# Patient Record
Sex: FEMALE
[De-identification: names, ages, dates, MRNs, and addresses within clinical notes are randomized per-mention and may not be internally consistent; named-entity substitution may affect disease eponyms.]

---

## 2017-11-09 ENCOUNTER — FORM ENCOUNTER (OUTPATIENT)
Age: 47
End: 2017-11-09

## 2018-11-15 ENCOUNTER — FORM ENCOUNTER (OUTPATIENT)
Age: 48
End: 2018-11-15

## 2019-11-21 ENCOUNTER — FORM ENCOUNTER (OUTPATIENT)
Age: 49
End: 2019-11-21

## 2020-11-24 ENCOUNTER — FORM ENCOUNTER (OUTPATIENT)
Age: 50
End: 2020-11-24

## 2020-11-29 ENCOUNTER — FORM ENCOUNTER (OUTPATIENT)
Age: 50
End: 2020-11-29

## 2022-01-31 ENCOUNTER — NON-APPOINTMENT (OUTPATIENT)
Age: 52
End: 2022-01-31

## 2022-01-31 ENCOUNTER — APPOINTMENT (OUTPATIENT)
Dept: BREAST CENTER | Facility: CLINIC | Age: 52
End: 2022-01-31
Payer: COMMERCIAL

## 2022-01-31 VITALS
WEIGHT: 105 LBS | BODY MASS INDEX: 19.83 KG/M2 | HEIGHT: 61 IN | HEART RATE: 80 BPM | SYSTOLIC BLOOD PRESSURE: 112 MMHG | DIASTOLIC BLOOD PRESSURE: 75 MMHG

## 2022-01-31 DIAGNOSIS — Z78.9 OTHER SPECIFIED HEALTH STATUS: ICD-10-CM

## 2022-01-31 PROCEDURE — 99213 OFFICE O/P EST LOW 20 MIN: CPT

## 2023-02-01 ENCOUNTER — APPOINTMENT (OUTPATIENT)
Dept: BREAST CENTER | Facility: CLINIC | Age: 53
End: 2023-02-01
Payer: COMMERCIAL

## 2023-02-01 ENCOUNTER — NON-APPOINTMENT (OUTPATIENT)
Age: 53
End: 2023-02-01

## 2023-02-01 VITALS
SYSTOLIC BLOOD PRESSURE: 120 MMHG | DIASTOLIC BLOOD PRESSURE: 77 MMHG | HEIGHT: 61 IN | BODY MASS INDEX: 21.71 KG/M2 | WEIGHT: 115 LBS | HEART RATE: 74 BPM

## 2023-02-01 DIAGNOSIS — Z78.9 OTHER SPECIFIED HEALTH STATUS: ICD-10-CM

## 2023-02-01 DIAGNOSIS — Z00.00 ENCOUNTER FOR GENERAL ADULT MEDICAL EXAMINATION W/OUT ABNORMAL FINDINGS: ICD-10-CM

## 2023-02-01 PROCEDURE — 99213 OFFICE O/P EST LOW 20 MIN: CPT

## 2023-02-01 NOTE — PHYSICAL EXAM
[de-identified] : Bilateral breast/axilla/supraclavicular area: No masses, discharge, or adenopathy

## 2023-02-01 NOTE — PAST MEDICAL HISTORY
[Perimenopausal] : The patient is perimenopausal [FreeTextEntry6] : n/a [FreeTextEntry7] : n/a [FreeTextEntry8] : yes

## 2023-02-01 NOTE — HISTORY OF PRESENT ILLNESS
[FreeTextEntry1] : Patient is a 52y.o female here for breast cancer screening. Hx of LEFT needle loc for benign tissue in 2012 (age 41). No known fam hx of breast/ovarian CA. She denies palpable breast mass or nipple discharge.\par \par 10/10/17: B/l MG & US- 3 benign, stable masses seen in each breast.-BIRAD 2\par 11/16/18: B/l MG & US- Multiple masses bilaterally, 3 on right and 2 on the left all benign inn appearance\par 11/22/19: B/l MG & US- -benign stable masses bilaterally\par 11/25/20: B/l MG & US- stable masses and benign cysts\par 1/31/22: B/l MG & US- R stable nodular asymmetry, US: BL stable nodules, L axillary LN likely from covid vaccine- Rec L US in 2 months- B- RADS 3 \par 4/4/22: L US- 1.0cm morphologically benign-appearing LN. BI-RADS 2\par 2/1/23: B/l MG & US- extremly dense. US- B/l cysts. L 0.7cm nodule 9:00 6FN. BI-RADS 2

## 2024-01-30 PROBLEM — N63.0 BREAST MASS IN FEMALE: Status: ACTIVE | Noted: 2024-01-30

## 2024-02-05 ENCOUNTER — APPOINTMENT (OUTPATIENT)
Dept: BREAST CENTER | Facility: CLINIC | Age: 54
End: 2024-02-05
Payer: COMMERCIAL

## 2024-02-05 VITALS
WEIGHT: 120 LBS | DIASTOLIC BLOOD PRESSURE: 66 MMHG | HEIGHT: 61 IN | BODY MASS INDEX: 22.66 KG/M2 | HEART RATE: 80 BPM | SYSTOLIC BLOOD PRESSURE: 109 MMHG

## 2024-02-05 DIAGNOSIS — Z12.39 ENCOUNTER FOR OTHER SCREENING FOR MALIGNANT NEOPLASM OF BREAST: ICD-10-CM

## 2024-02-05 DIAGNOSIS — Z78.9 OTHER SPECIFIED HEALTH STATUS: ICD-10-CM

## 2024-02-05 DIAGNOSIS — N63.0 UNSPECIFIED LUMP IN UNSPECIFIED BREAST: ICD-10-CM

## 2024-02-05 PROCEDURE — 99213 OFFICE O/P EST LOW 20 MIN: CPT

## 2024-02-05 NOTE — PHYSICAL EXAM
[Normocephalic] : normocephalic [EOMI] : extra ocular movement intact [Supple] : supple [No Supraclavicular Adenopathy] : no supraclavicular adenopathy [No Cervical Adenopathy] : no cervical adenopathy [de-identified] : Bilateral breast/axilla/supraclavicular area: No masses, discharge, or adenopathy

## 2024-02-05 NOTE — HISTORY OF PRESENT ILLNESS
[FreeTextEntry1] : Patient is a 53 y.o female here for breast cancer screening. Hx of LEFT needle loc excisional bx for benign tissue in 2012 (age 41). Denies family history of breast or ovarian cancer. Patient denies palpable masses, skin changes, or nipple discharge bilaterally.  10/10/17: B/l MG & US- 3 benign, stable masses seen in each breast.-BIRAD 2 11/16/18: B/l MG & US- Multiple masses bilaterally, 3 on right and 2 on the left all benign inn appearance 11/22/19: B/l MG & US- -benign stable masses bilaterally 11/25/20: B/l MG & US- stable masses and benign cysts 1/31/22: B/l MG & US- R stable nodular asymmetry, US: BL stable nodules, L axillary LN likely from covid vaccine- Rec L US in 2 months- B- RADS 3  4/4/22: L US- 1.0cm morphologically benign-appearing LN. BI-RADS 2 2/1/23: B/l MG & US- extremly dense. US- B/l cysts. L 0.7cm nodule 9:00 6FN. BI-RADS 2 2/5/24: B/l MG & US-extremely dense, L stable 07 cm nodular density, US- R stable 1 cm mass 2:00 3FN, L stable 0.6 cm mass 9:00 6FN, MANASA. BIRADS 2.

## 2024-02-05 NOTE — PAST MEDICAL HISTORY
[Perimenopausal] : The patient is perimenopausal [Menarche Age ____] : age at menarche was [unfilled] [Definite ___ (Date)] : the last menstrual period was [unfilled] [Regular Cycle Intervals] : have been regular [Total Preg ___] : G[unfilled] [Live Births ___] : P[unfilled]  [Age At Live Birth ___] : Age at live birth: [unfilled] [Approximately ___] : the LMP was approximately [unfilled] [FreeTextEntry6] : n/a [FreeTextEntry7] : n/a [FreeTextEntry8] : yes

## 2025-02-10 ENCOUNTER — APPOINTMENT (OUTPATIENT)
Dept: BREAST CENTER | Facility: CLINIC | Age: 55
End: 2025-02-10
Payer: COMMERCIAL

## 2025-02-10 ENCOUNTER — NON-APPOINTMENT (OUTPATIENT)
Age: 55
End: 2025-02-10

## 2025-02-10 VITALS
DIASTOLIC BLOOD PRESSURE: 83 MMHG | HEART RATE: 73 BPM | SYSTOLIC BLOOD PRESSURE: 121 MMHG | HEIGHT: 60.75 IN | WEIGHT: 117 LBS | BODY MASS INDEX: 22.38 KG/M2

## 2025-02-10 DIAGNOSIS — Z12.39 ENCOUNTER FOR OTHER SCREENING FOR MALIGNANT NEOPLASM OF BREAST: ICD-10-CM

## 2025-02-10 PROCEDURE — 99213 OFFICE O/P EST LOW 20 MIN: CPT
